# Patient Record
Sex: MALE | Race: WHITE | NOT HISPANIC OR LATINO | Employment: FULL TIME | URBAN - METROPOLITAN AREA
[De-identification: names, ages, dates, MRNs, and addresses within clinical notes are randomized per-mention and may not be internally consistent; named-entity substitution may affect disease eponyms.]

---

## 2018-01-01 ENCOUNTER — OFFICE VISIT (OUTPATIENT)
Dept: URGENT CARE | Facility: PHYSICIAN GROUP | Age: 28
End: 2018-01-01
Payer: COMMERCIAL

## 2018-01-01 VITALS
BODY MASS INDEX: 27.83 KG/M2 | WEIGHT: 210 LBS | HEIGHT: 73 IN | RESPIRATION RATE: 16 BRPM | TEMPERATURE: 98.3 F | OXYGEN SATURATION: 98 % | HEART RATE: 100 BPM | SYSTOLIC BLOOD PRESSURE: 110 MMHG | DIASTOLIC BLOOD PRESSURE: 64 MMHG

## 2018-01-01 DIAGNOSIS — J01.90 ACUTE NON-RECURRENT SINUSITIS, UNSPECIFIED LOCATION: ICD-10-CM

## 2018-01-01 PROCEDURE — 99214 OFFICE O/P EST MOD 30 MIN: CPT | Performed by: PHYSICIAN ASSISTANT

## 2018-01-01 RX ORDER — DEXTROMETHORPHAN HYDROBROMIDE AND PROMETHAZINE HYDROCHLORIDE 15; 6.25 MG/5ML; MG/5ML
SYRUP ORAL
Qty: 180 ML | Refills: 0 | Status: SHIPPED | OUTPATIENT
Start: 2018-01-01 | End: 2018-11-10

## 2018-01-01 RX ORDER — AMOXICILLIN AND CLAVULANATE POTASSIUM 875; 125 MG/1; MG/1
1 TABLET, FILM COATED ORAL 2 TIMES DAILY
Qty: 14 TAB | Refills: 0 | Status: SHIPPED | OUTPATIENT
Start: 2018-01-01 | End: 2018-01-08

## 2018-01-01 ASSESSMENT — ENCOUNTER SYMPTOMS
SWOLLEN GLANDS: 1
SINUS PRESSURE: 1
COUGH: 1
SORE THROAT: 1
HEADACHES: 1
HOARSE VOICE: 1

## 2018-01-01 NOTE — PROGRESS NOTES
"Subjective:      Mehrdad Magallanes is a 27 y.o. male who presents with URI (x1wk cough, chest congestion, sinus pressure/pain)            Sinusitis   This is a new problem. The current episode started 1 to 4 weeks ago. The problem has been gradually worsening since onset. There has been no fever. The fever has been present for less than 1 day. His pain is at a severity of 7/10. The pain is moderate. Associated symptoms include congestion, coughing, ear pain, headaches, a hoarse voice, sinus pressure, a sore throat and swollen glands. Past treatments include acetaminophen, lying down, oral decongestants and spray decongestants. The treatment provided no relief.       Review of Systems   HENT: Positive for congestion, ear pain, hoarse voice, sinus pressure and sore throat.    Respiratory: Positive for cough.    Neurological: Positive for headaches.          Objective:     /64   Pulse 100   Temp 36.8 °C (98.3 °F)   Resp 16   Ht 1.854 m (6' 1\")   Wt 95.3 kg (210 lb)   SpO2 98%   BMI 27.71 kg/m²      Physical Exam       Gen.: Patient is A and O ×3, no acute distress, well-nourished well-hydrated  Vitals: Are listed and unremarkable  HEENT: Heads normocephalic, atraumatic, PERRLA, extraocular movements intact, TMs and oropharynx clear. Patient has pain to percussion of the maxillary sinus region. It is worse on his right side  Neck: Soft supple without cervical lymphadenopathy  Cardiovascular: Regular rate and rhythm normal S1 and S2. No murmurs, rubs or gallops  Lungs are clear to auscultation bilaterally. no wheezes rales or rhonchi  Abdomen is soft, nontender, nondistended with good bowel sounds, no hepatosplenomegaly  Skin: Is well perfused without evidence of rash or lesions  Neurological:  cranial nerves II through XII were assessed and intact.  Musculoskeletal: Full range of motion, 5 out of 5 strength against resistance  Neurovascularly: Intact with a 2 second cap refill, good distal pulses     "   Assessment/Plan:     1. Acute non-recurrent sinusitis, unspecified location  Take Allegra-D as well. Increase fluids and follow-up as needed  - amoxicillin-clavulanate (AUGMENTIN) 875-125 MG Tab; Take 1 Tab by mouth 2 times a day for 7 days.  Dispense: 14 Tab; Refill: 0  - promethazine-dextromethorphan (PROMETHAZINE-DM) 6.25-15 MG/5ML syrup; Take 5mls every six hours as needed for cough  Dispense: 180 mL; Refill: 0

## 2018-01-01 NOTE — LETTER
January 1, 2018         Patient: Mehrdad Magallanes   YOB: 1990   Date of Visit: 1/1/2018           To Whom it May Concern:    Mehrdad Magallanes was seen in my clinic on 1/1/2018. Please excuse from work on Wednesday, January 3, 2018.    If you have any questions or concerns, please don't hesitate to call.        Sincerely,           Heather Clayton P.A.-C.  Electronically Signed

## 2018-11-10 ENCOUNTER — HOSPITAL ENCOUNTER (OUTPATIENT)
Dept: RADIOLOGY | Facility: MEDICAL CENTER | Age: 28
End: 2018-11-10
Attending: FAMILY MEDICINE
Payer: COMMERCIAL

## 2018-11-10 ENCOUNTER — OFFICE VISIT (OUTPATIENT)
Dept: URGENT CARE | Facility: PHYSICIAN GROUP | Age: 28
End: 2018-11-10
Payer: COMMERCIAL

## 2018-11-10 VITALS
TEMPERATURE: 98.3 F | RESPIRATION RATE: 14 BRPM | WEIGHT: 215 LBS | SYSTOLIC BLOOD PRESSURE: 126 MMHG | BODY MASS INDEX: 28.49 KG/M2 | OXYGEN SATURATION: 98 % | HEIGHT: 73 IN | HEART RATE: 96 BPM | DIASTOLIC BLOOD PRESSURE: 86 MMHG

## 2018-11-10 DIAGNOSIS — M54.50 ACUTE LOW BACK PAIN WITHOUT SCIATICA, UNSPECIFIED BACK PAIN LATERALITY: ICD-10-CM

## 2018-11-10 DIAGNOSIS — S59.902A INJURY OF LEFT ELBOW, INITIAL ENCOUNTER: ICD-10-CM

## 2018-11-10 PROCEDURE — 72100 X-RAY EXAM L-S SPINE 2/3 VWS: CPT

## 2018-11-10 PROCEDURE — 99214 OFFICE O/P EST MOD 30 MIN: CPT | Performed by: FAMILY MEDICINE

## 2018-11-10 PROCEDURE — 73080 X-RAY EXAM OF ELBOW: CPT | Mod: LT

## 2018-11-10 PROCEDURE — 72072 X-RAY EXAM THORAC SPINE 3VWS: CPT

## 2018-11-10 ASSESSMENT — PAIN SCALES - GENERAL: PAINLEVEL: 10=SEVERE PAIN

## 2018-11-10 NOTE — PATIENT INSTRUCTIONS
Sling for comfort  Over the counter medication for pain  Icing frequently  Follow up this week, sooner if any worsening or new symptoms    For more information see the handout below      Contusion  A contusion is a deep bruise. Contusions are the result of a blunt injury to tissues and muscle fibers under the skin. The injury causes bleeding under the skin. The skin overlying the contusion may turn blue, purple, or yellow. Minor injuries will give you a painless contusion, but more severe contusions may stay painful and swollen for a few weeks.  What are the causes?  This condition is usually caused by a blow, trauma, or direct force to an area of the body.  What are the signs or symptoms?  Symptoms of this condition include:  · Swelling of the injured area.  · Pain and tenderness in the injured area.  · Discoloration. The area may have redness and then turn blue, purple, or yellow.  How is this diagnosed?  This condition is diagnosed based on a physical exam and medical history. An X-ray, CT scan, or MRI may be needed to determine if there are any associated injuries, such as broken bones (fractures).  How is this treated?  Specific treatment for this condition depends on what area of the body was injured. In general, the best treatment for a contusion is resting, icing, applying pressure to (compression), and elevating the injured area. This is often called the RICE strategy. Over-the-counter anti-inflammatory medicines may also be recommended for pain control.  Follow these instructions at home:  · Rest the injured area.  · If directed, apply ice to the injured area:  ¨ Put ice in a plastic bag.  ¨ Place a towel between your skin and the bag.  ¨ Leave the ice on for 20 minutes, 2-3 times per day.  · If directed, apply light compression to the injured area using an elastic bandage. Make sure the bandage is not wrapped too tightly. Remove and reapply the bandage as directed by your health care provider.  · If  possible, raise (elevate) the injured area above the level of your heart while you are sitting or lying down.  · Take over-the-counter and prescription medicines only as told by your health care provider.  Contact a health care provider if:  · Your symptoms do not improve after several days of treatment.  · Your symptoms get worse.  · You have difficulty moving the injured area.  Get help right away if:  · You have severe pain.  · You have numbness in a hand or foot.  · Your hand or foot turns pale or cold.  This information is not intended to replace advice given to you by your health care provider. Make sure you discuss any questions you have with your health care provider.  Document Released: 09/27/2006 Document Revised: 04/27/2017 Document Reviewed: 05/04/2016  Elsevier Interactive Patient Education © 2017 [employer]

## 2018-11-10 NOTE — PROGRESS NOTES
"Subjective:      Mehrdad Magallanes is a 28 y.o. male who presents with Arm Injury (Left arm pain post fall down the stairs this am)            This is a new problem.  Patient reports that he was caring both of his children in his arms and coming down the steps and slept and fell 6-7 steps this morning.  He hold on tightly to his children so they do not get injured.  He is complaining of left elbow pain.  He denies any neck pain, headache or loss of consciousness.  Review of system positive for mild back pain.  He denies any paresthesias in the upper or lower extremity.  He denies any radiculopathy.  No prior fractures reported in the elbow or in the spine region.  He drove himself here.  He denies any dizziness or lightheadedness.  He has not taken any over-the-counter medication for pain. No other injuries        Review of Systems   All other systems reviewed and are negative.    PMH:  has no past medical history on file.  MEDS: No current outpatient prescriptions on file.  ALLERGIES: No Known Allergies  SURGHX: No past surgical history on file.  SOCHX:  reports that he has never smoked. His smokeless tobacco use includes Chew. He reports that he drinks alcohol. He reports that he does not use drugs.  FH: Family history was reviewed, no pertinent findings to report       Objective:     /86   Pulse 96   Temp 36.8 °C (98.3 °F)   Resp 14   Ht 1.854 m (6' 1\")   Wt 97.5 kg (215 lb)   SpO2 98%   BMI 28.37 kg/m²      Physical Exam   Constitutional: He is oriented to person, place, and time. He appears well-developed and well-nourished.  Non-toxic appearance. He does not have a sickly appearance. He does not appear ill. No distress.   Eyes: Conjunctivae are normal.   Neck: Normal range of motion. No spinous process tenderness and no muscular tenderness present. Normal range of motion present.   Cardiovascular: Normal rate.    Pulmonary/Chest: Effort normal. No respiratory distress.   Musculoskeletal:   " Left shoulder: Normal.        Left elbow: He exhibits decreased range of motion and swelling. He exhibits no deformity and no laceration. Tenderness found. Olecranon process tenderness noted.        Cervical back: He exhibits normal range of motion, no tenderness, no bony tenderness, no swelling, no edema, no deformity, no laceration, no pain, no spasm and normal pulse.        Thoracic back: He exhibits tenderness (in the outline area). He exhibits normal range of motion, no bony tenderness, no swelling, no edema, no deformity, no laceration and normal pulse.        Lumbar back: He exhibits tenderness (in the outline area). He exhibits normal range of motion, no bony tenderness, no swelling, no edema, no deformity, no laceration and normal pulse.        Back:    Patient did not want to initially move the elbow but after xray was encouraged to active ROM and ROM was slightly decreased    No bruising in the back or in the left UE   Neurological: He is alert and oriented to person, place, and time.   Grossly normal   Skin: Skin is warm. He is not diaphoretic. No pallor.     X-ray of the left elbow, thoracic and lumbar spine: Negative for acute abnormalities per my review       Assessment/Plan:     1. Injury of left elbow, initial encounter  - DX-ELBOW-COMPLETE 3+ LEFT; Future  - Slings    2. Acute low back pain without sciatica, unspecified back pain laterality  - DX-LUMBAR SPINE-2 OR 3 VIEWS; Future      Altogether reassuring x-ray which will radiology confirmed  Reassured  Sling for comfort for the elbow and icing frequently and NSAIDs over-the-counter as needed  We also briefly discussed the nature of contusion  Plan per orders and instructions  Warning signs reviewed  The case was discussed and reviewed with Dr Dangelo during Dr. Dos Santos's training period.

## 2019-08-23 DIAGNOSIS — R06.02 SOB (SHORTNESS OF BREATH): ICD-10-CM

## 2019-08-28 ENCOUNTER — NON-PROVIDER VISIT (OUTPATIENT)
Dept: PULMONOLOGY | Facility: HOSPICE | Age: 29
End: 2019-08-28
Attending: INTERNAL MEDICINE
Payer: COMMERCIAL

## 2019-08-28 ENCOUNTER — OFFICE VISIT (OUTPATIENT)
Dept: PULMONOLOGY | Facility: HOSPICE | Age: 29
End: 2019-08-28
Payer: COMMERCIAL

## 2019-08-28 VITALS
RESPIRATION RATE: 16 BRPM | HEART RATE: 101 BPM | DIASTOLIC BLOOD PRESSURE: 68 MMHG | SYSTOLIC BLOOD PRESSURE: 110 MMHG | BODY MASS INDEX: 29.4 KG/M2 | TEMPERATURE: 98.1 F | WEIGHT: 210 LBS | OXYGEN SATURATION: 94 % | HEIGHT: 71 IN

## 2019-08-28 VITALS — BODY MASS INDEX: 29.4 KG/M2 | WEIGHT: 210 LBS | HEIGHT: 71 IN

## 2019-08-28 DIAGNOSIS — R06.02 SOB (SHORTNESS OF BREATH): ICD-10-CM

## 2019-08-28 DIAGNOSIS — Z57.9 OCCUPATIONAL EXPOSURE IN WORKPLACE: ICD-10-CM

## 2019-08-28 DIAGNOSIS — R55 SYNCOPE, UNSPECIFIED SYNCOPE TYPE: ICD-10-CM

## 2019-08-28 PROCEDURE — 94060 EVALUATION OF WHEEZING: CPT | Performed by: INTERNAL MEDICINE

## 2019-08-28 PROCEDURE — 94729 DIFFUSING CAPACITY: CPT | Performed by: INTERNAL MEDICINE

## 2019-08-28 PROCEDURE — 99204 OFFICE O/P NEW MOD 45 MIN: CPT | Mod: 25 | Performed by: INTERNAL MEDICINE

## 2019-08-28 PROCEDURE — 94726 PLETHYSMOGRAPHY LUNG VOLUMES: CPT | Performed by: INTERNAL MEDICINE

## 2019-08-28 RX ORDER — IBUPROFEN 800 MG/1
TABLET ORAL
Refills: 0 | COMMUNITY
Start: 2019-07-10

## 2019-08-28 SDOH — HEALTH STABILITY - PHYSICAL HEALTH: OCCUPATIONAL EXPOSURE TO UNSPECIFIED RISK FACTOR: Z57.9

## 2019-08-28 ASSESSMENT — PULMONARY FUNCTION TESTS
FEV1/FVC_PREDICTED: 82
FVC: 4.7
FEV1_PERCENT_CHANGE: 6
FEV1/FVC_PERCENT_CHANGE: 3
FEV1_PERCENT_PREDICTED: 85
FEV1: 4.09
FEV1_PERCENT_PREDICTED: 90
FVC_PERCENT_PREDICTED: 87
FEV1_PERCENT_CHANGE: 2
FVC_LLN: 4.63
FVC_PERCENT_PREDICTED: 84
FEV1/FVC: 82
FEV1/FVC_PERCENT_PREDICTED: 103
FEV1/FVC: 84.85
FVC: 4.82
FEV1/FVC: 82
FEV1_LLN: 3.76
FEV1/FVC_PERCENT_CHANGE: 300
FEV1/FVC_PERCENT_PREDICTED: 81
FEV1/FVC_PERCENT_PREDICTED: 99
FEV1/FVC_PERCENT_PREDICTED: 101
FEV1_PREDICTED: 4.5
FEV1/FVC_PERCENT_PREDICTED: 103
FVC_PREDICTED: 5.54
FEV1: 3.84
FEV1/FVC: 85
FEV1/FVC_PERCENT_LLN: 68

## 2019-08-28 ASSESSMENT — ENCOUNTER SYMPTOMS
SHORTNESS OF BREATH: 0
PALPITATIONS: 0
SINUS PAIN: 0
ABDOMINAL PAIN: 0
NAUSEA: 0
DEPRESSION: 0
SPUTUM PRODUCTION: 0
SPEECH CHANGE: 0
BLURRED VISION: 0
BACK PAIN: 0
SORE THROAT: 0
EYE DISCHARGE: 0
HEARTBURN: 0
VOMITING: 0
FEVER: 0
FALLS: 0
EYE PAIN: 0
CLAUDICATION: 0
HEMOPTYSIS: 0
DIZZINESS: 0
WEAKNESS: 0
DIAPHORESIS: 0
WHEEZING: 0
EYE REDNESS: 0
STRIDOR: 0
DOUBLE VISION: 0
DIARRHEA: 0
FOCAL WEAKNESS: 0
NECK PAIN: 0
WEIGHT LOSS: 0
CONSTIPATION: 0
CHILLS: 0
MYALGIAS: 0
TREMORS: 0
HEADACHES: 1
ORTHOPNEA: 0
PHOTOPHOBIA: 0
COUGH: 0
PND: 0

## 2019-08-28 NOTE — PROGRESS NOTES
Chief Complaint   Patient presents with   • Establish Care     referral 8/13/19 JAMIL Kim MD DX abnormal results of pulmonary function test    • Results     pft 60 8/28/19        HPI: This patient is a 29 y.o. male presenting for evaluation of syncope following spirometry done for work clearance.  The patient has essentially no past medical history.  He takes no regular prescription medications.  He does take amino acids and see for dietary supplement prior to workouts.  He is a lifelong non-smoker and has a history of chewing tobacco but quit in the past year.  He does have a history of alcohol abuse but has been sober for the past 5 years.  There is no family history of atopic, autoimmune or lung disease.  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  He does require spirometry as part of annual fitness evaluation to perform his job.  He has never had issues with abnormal spirometry or issues performing the testing until the past 2 years after which apparently there was a change in the machine used for clinic spirometry.  The patient tells me that since the change of machine he has difficulty performing the maneuver and with time required for exhalation on the new machine leading to suboccipital headache and subsequent syncope.  The episodes last for only a few seconds and he recovers fully with no evidence of seizure or postictal state.  These episodes have never happened outside of spirometry testing on new machine for work.  He was able to perform full PFTs including spirometry, lung volumes and DLCO maneuver with no syncopal event or headache in our clinic today.  His pulmonary function test show normal air flows, normal lung volumes, normal DLCO.  He does have bronchodilator responsiveness but baseline spirometry is normal as stated.  Flow volume loop is normal.  Patient otherwise denies shortness of breath, cough, dyspnea on exertion.  .    History  reviewed. No pertinent past medical history.    Social History     Socioeconomic History   • Marital status: Single     Spouse name: Not on file   • Number of children: Not on file   • Years of education: Not on file   • Highest education level: Not on file   Occupational History   • Not on file   Social Needs   • Financial resource strain: Not on file   • Food insecurity:     Worry: Not on file     Inability: Not on file   • Transportation needs:     Medical: Not on file     Non-medical: Not on file   Tobacco Use   • Smoking status: Never Smoker   • Smokeless tobacco: Former User     Types: Chew   • Tobacco comment: tobacco chewer 1/2 can daily for 16 years    Substance and Sexual Activity   • Alcohol use: Not Currently     Comment: occaisional    • Drug use: No   • Sexual activity: Not on file   Lifestyle   • Physical activity:     Days per week: Not on file     Minutes per session: Not on file   • Stress: Not on file   Relationships   • Social connections:     Talks on phone: Not on file     Gets together: Not on file     Attends Anabaptism service: Not on file     Active member of club or organization: Not on file     Attends meetings of clubs or organizations: Not on file     Relationship status: Not on file   • Intimate partner violence:     Fear of current or ex partner: Not on file     Emotionally abused: Not on file     Physically abused: Not on file     Forced sexual activity: Not on file   Other Topics Concern   • Not on file   Social History Narrative   • Not on file       Family History   Problem Relation Age of Onset   • No Known Problems Mother    • No Known Problems Father        Current Outpatient Medications on File Prior to Visit   Medication Sig Dispense Refill   • ibuprofen (MOTRIN) 800 MG Tab TK 1 T PO Q 6 H PRN P  0     No current facility-administered medications on file prior to visit.        Allergies: Patient has no known allergies.    ROS:   Review of Systems   Constitutional: Negative for  "chills, diaphoresis, fever, malaise/fatigue and weight loss.   HENT: Negative for congestion, ear discharge, ear pain, hearing loss, nosebleeds, sinus pain, sore throat and tinnitus.    Eyes: Negative for blurred vision, double vision, photophobia, pain, discharge and redness.   Respiratory: Negative for cough, hemoptysis, sputum production, shortness of breath, wheezing and stridor.    Cardiovascular: Negative for chest pain, palpitations, orthopnea, claudication, leg swelling and PND.   Gastrointestinal: Negative for abdominal pain, constipation, diarrhea, heartburn, nausea and vomiting.   Genitourinary: Negative for dysuria and urgency.   Musculoskeletal: Negative for back pain, falls, joint pain, myalgias and neck pain.   Skin: Negative for itching and rash.   Neurological: Positive for headaches. Negative for dizziness, tremors, speech change, focal weakness and weakness.        Syncope   Endo/Heme/Allergies: Negative for environmental allergies.   Psychiatric/Behavioral: Negative for depression.       /68 (BP Location: Left arm, Patient Position: Sitting, BP Cuff Size: Adult)   Pulse (!) 101   Temp 36.7 °C (98.1 °F) (Temporal)   Resp 16   Ht 1.791 m (5' 10.5\")   Wt 95.3 kg (210 lb)   SpO2 94%     Physical Exam:  Physical Exam   Constitutional: He is oriented to person, place, and time. He appears well-developed and well-nourished. No distress.   HENT:   Head: Normocephalic and atraumatic.   Right Ear: External ear normal.   Left Ear: External ear normal.   Mouth/Throat: Oropharynx is clear and moist. No oropharyngeal exudate.   Eyes: Pupils are equal, round, and reactive to light. Conjunctivae and EOM are normal. No scleral icterus.   Neck: Normal range of motion. Neck supple. No tracheal deviation present.   Cardiovascular: Normal rate, regular rhythm and normal heart sounds. Exam reveals no gallop and no friction rub.   No murmur heard.  Pulmonary/Chest: Effort normal and breath sounds normal. No " stridor. No respiratory distress. He has no wheezes.   Abdominal: Soft. He exhibits no distension.   Musculoskeletal: Normal range of motion. He exhibits no edema or deformity.   Neurological: He is alert and oriented to person, place, and time. No cranial nerve deficit.   Skin: Skin is warm and dry. No rash noted.   Psychiatric: He has a normal mood and affect.       PFTs as reviewed by me personally: as per HPI    Assessment:  1. Occupational exposure in workplace  Spirometry   2. Syncope, unspecified syncope type         Plan:  1.  Patient wears required mask protection for all work and has never had issues with pulmonary function including abnormal spirometry or symptoms in the past.  I recommend he continue annual surveillance which can be done with us or through work however I would recommend having the new machine calibrated and evaluated to ensure it is functioning properly given patient reports there have been other colleagues having issues with lightheadedness following testing on most recent machine.  2.  Symptoms including headache and syncope following prolonged breath-hold are most consistent with hyper ventilation which I suspect is induced due to possible excessive exhalation during testing on new machine.  I recommend the machine be evaluated possibly by respiratory therapist.  In the meantime the patient has normal full pulmonary function tests in clinic today.  He has no restrictions from a pulmonary standpoint we are happy to see him on an annual basis.  Return in about 1 year (around 8/28/2020) for pfts.

## 2019-08-28 NOTE — PROCEDURES
Technician: GUILLERMO Matthew    Technician Comment:  Good patient effort & cooperation.  The results of this test meet the ATS/ERS standards for acceptability & reproducibility.  Test was performed on the [employer] Body Plethysmograph-Elite DX system.  Predicted values were [facility]-3 for spirometry, [facility] for DLCO, ITS for Lung Volumes.  The DLCO was uncorrected for Hgb.  A bronchodilator of Ventolin HFA -2puffs via spacer administered.  DLCO performed during dilation period.    Interpretation:  1.  Baseline spirometry shows normal air flows.  2.  There is significant bronchodilator response by absolute increase in FEV1.  3.  Lung volumes are within normal limits.  4.  DLCO is normal to slightly elevated at 123% of predicted.  Normal pulmonary function test with evidence of bronchodilator reactivity.  This may be consistent with a diagnosis of reactive airways disease.  Suggest clinical correlation.

## 2020-06-10 ENCOUNTER — OFFICE VISIT (OUTPATIENT)
Dept: PULMONOLOGY | Facility: HOSPICE | Age: 30
End: 2020-06-10
Payer: COMMERCIAL

## 2020-06-10 ENCOUNTER — NON-PROVIDER VISIT (OUTPATIENT)
Dept: PULMONOLOGY | Facility: HOSPICE | Age: 30
End: 2020-06-10
Payer: COMMERCIAL

## 2020-06-10 VITALS
OXYGEN SATURATION: 93 % | TEMPERATURE: 98.6 F | RESPIRATION RATE: 16 BRPM | DIASTOLIC BLOOD PRESSURE: 66 MMHG | SYSTOLIC BLOOD PRESSURE: 110 MMHG | HEART RATE: 106 BPM | HEIGHT: 71 IN | WEIGHT: 222 LBS | BODY MASS INDEX: 31.08 KG/M2

## 2020-06-10 VITALS — WEIGHT: 222 LBS | HEIGHT: 71 IN | BODY MASS INDEX: 31.08 KG/M2

## 2020-06-10 DIAGNOSIS — Z57.9 OCCUPATIONAL EXPOSURE IN WORKPLACE: ICD-10-CM

## 2020-06-10 DIAGNOSIS — R55 SYNCOPE, UNSPECIFIED SYNCOPE TYPE: ICD-10-CM

## 2020-06-10 DIAGNOSIS — E66.9 CLASS 1 OBESITY WITH BODY MASS INDEX (BMI) OF 31.0 TO 31.9 IN ADULT, UNSPECIFIED OBESITY TYPE, UNSPECIFIED WHETHER SERIOUS COMORBIDITY PRESENT: ICD-10-CM

## 2020-06-10 PROCEDURE — 94060 EVALUATION OF WHEEZING: CPT | Performed by: INTERNAL MEDICINE

## 2020-06-10 PROCEDURE — 99213 OFFICE O/P EST LOW 20 MIN: CPT | Mod: 25 | Performed by: INTERNAL MEDICINE

## 2020-06-10 SDOH — HEALTH STABILITY - PHYSICAL HEALTH: OCCUPATIONAL EXPOSURE TO UNSPECIFIED RISK FACTOR: Z57.9

## 2020-06-10 ASSESSMENT — PULMONARY FUNCTION TESTS
FEV1/FVC: 86.13
FVC: 4.47
FVC_PREDICTED: 5.55
FEV1/FVC_PERCENT_CHANGE: 0
FEV1/FVC_PERCENT_PREDICTED: 104
FVC: 4.52
FEV1_PERCENT_CHANGE: -1
FVC_PERCENT_PREDICTED: 81
FEV1/FVC_PREDICTED: 81.26
FEV1_PREDICTED: 84
FEV1/FVC: 85
FVC_PERCENT_PREDICTED: 80
FEV1: 3.85
FEV1_PERCENT_CHANGE: 0
FEV1_PREDICTED: 4.51
FEV1/FVC_PERCENT_PREDICTED: 106
FEV1_PERCENT_PREDICTED: 85
FEV1: 3.82

## 2020-06-10 ASSESSMENT — ENCOUNTER SYMPTOMS
PALPITATIONS: 0
ORTHOPNEA: 0
EYE PAIN: 0
VOMITING: 0
HEARTBURN: 0
HEMOPTYSIS: 0
DIZZINESS: 0
ABDOMINAL PAIN: 0
EYE REDNESS: 0
MYALGIAS: 0
SINUS PAIN: 0
SORE THROAT: 0
DEPRESSION: 0
COUGH: 0
FEVER: 0
BLURRED VISION: 0
DOUBLE VISION: 0
CONSTIPATION: 0
BACK PAIN: 0
SPUTUM PRODUCTION: 0
STRIDOR: 0
FALLS: 0
PHOTOPHOBIA: 0
NAUSEA: 0
PND: 0
WEAKNESS: 0
CHILLS: 0
EYE DISCHARGE: 0
WHEEZING: 0
NECK PAIN: 0
TREMORS: 0
CLAUDICATION: 0
FOCAL WEAKNESS: 0
HEADACHES: 0
DIARRHEA: 0
WEIGHT LOSS: 0
SPEECH CHANGE: 0
SHORTNESS OF BREATH: 0
DIAPHORESIS: 0

## 2020-06-10 NOTE — PROGRESS NOTES
Chief Complaint   Patient presents with   • Follow-Up     last seen 8/28/19    • Results     PFT Giovany 6/10/2020         HPI: This patient is a 29 y.o. male whom is followed in our clinic for syncope following spirometry testing for work last seen by me on 8/28/19.   The patient has essentially no past medical history.  He takes no regular prescription medications.   He is a lifelong non-smoker and has a history of chewing tobacco but quit roughly one year ago.  He does have a history of alcohol abuse but has been sober for the past 5 years.  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  He does require spirometry as part of annual fitness evaluation to perform his job.  He has never had issues with abnormal spirometry or issues performing the testing until the past 2 years after which  there was a change in the machine used for spirometry.  The patient tells me that since the change of machine he has difficulty performing the maneuver with time required for exhalation leading to suboccipital headache and subsequent syncope.  The episodes last for only a few seconds and he recovers fully with no evidence of seizure or postictal state.  Episodes have never happened outside of spirometry testing on new machine for work.  He was able to perform full PFTs including spirometry, lung volumes and DLCO maneuver with no syncopal event or headache in our clinic last visit which showed normal airflows, +BD response, normal lung volumes and normal DLCO.  Given the patient was asymptomatic outside of his testing episodes, we will plan to see him back for repeat spirometry when due for work fitness physical.  He presents today for spirometry essentially unchanged from pulmonary function testing obtained in August of last year.  Values are within normal limits but on the low side of normal for FVC.  This can be explained by mild obesity.  Otherwise normal flow volume loop.   Patient did have some lightheadedness following the maneuver during his testing today.  Otherwise no new symptoms, he exercises on a regular basis with no shortness of breath or chest pain.  No wheezing.  No cough.    History reviewed. No pertinent past medical history.    Social History     Socioeconomic History   • Marital status: Single     Spouse name: Not on file   • Number of children: Not on file   • Years of education: Not on file   • Highest education level: Not on file   Occupational History   • Not on file   Social Needs   • Financial resource strain: Not on file   • Food insecurity     Worry: Not on file     Inability: Not on file   • Transportation needs     Medical: Not on file     Non-medical: Not on file   Tobacco Use   • Smoking status: Never Smoker   • Smokeless tobacco: Former User     Types: Chew   • Tobacco comment: tobacco chewer 1/2 can daily for 16 years    Substance and Sexual Activity   • Alcohol use: Not Currently     Comment: occaisional    • Drug use: No   • Sexual activity: Not on file   Lifestyle   • Physical activity     Days per week: Not on file     Minutes per session: Not on file   • Stress: Not on file   Relationships   • Social connections     Talks on phone: Not on file     Gets together: Not on file     Attends Yazdanism service: Not on file     Active member of club or organization: Not on file     Attends meetings of clubs or organizations: Not on file     Relationship status: Not on file   • Intimate partner violence     Fear of current or ex partner: Not on file     Emotionally abused: Not on file     Physically abused: Not on file     Forced sexual activity: Not on file   Other Topics Concern   • Not on file   Social History Narrative   • Not on file       Family History   Problem Relation Age of Onset   • No Known Problems Mother    • No Known Problems Father        Current Outpatient Medications on File Prior to Visit   Medication Sig Dispense Refill   • ibuprofen  "(MOTRIN) 800 MG Tab TK 1 T PO Q 6 H PRN P  0     No current facility-administered medications on file prior to visit.        Patient has no known allergies.      ROS:   Review of Systems   Constitutional: Negative for chills, diaphoresis, fever, malaise/fatigue and weight loss.   HENT: Negative for congestion, ear discharge, ear pain, hearing loss, nosebleeds, sinus pain, sore throat and tinnitus.    Eyes: Negative for blurred vision, double vision, photophobia, pain, discharge and redness.   Respiratory: Negative for cough, hemoptysis, sputum production, shortness of breath, wheezing and stridor.    Cardiovascular: Negative for chest pain, palpitations, orthopnea, claudication, leg swelling and PND.   Gastrointestinal: Negative for abdominal pain, constipation, diarrhea, heartburn, nausea and vomiting.   Genitourinary: Negative for dysuria and urgency.   Musculoskeletal: Negative for back pain, falls, joint pain, myalgias and neck pain.   Skin: Negative for itching and rash.   Neurological: Negative for dizziness, tremors, speech change, focal weakness, weakness and headaches.   Endo/Heme/Allergies: Negative for environmental allergies.   Psychiatric/Behavioral: Negative for depression.       /66 (BP Location: Left arm, Patient Position: Sitting, BP Cuff Size: Large adult)   Pulse (!) 106   Temp 37 °C (98.6 °F) (Temporal)   Resp 16   Ht 1.791 m (5' 10.5\")   Wt 100.7 kg (222 lb)   SpO2 93%   Physical Exam  Vitals signs reviewed.   Constitutional:       General: He is not in acute distress.     Appearance: Normal appearance. He is obese.   HENT:      Head: Normocephalic and atraumatic.      Right Ear: External ear normal.      Left Ear: External ear normal.      Nose: Nose normal. No congestion.      Mouth/Throat:      Mouth: Mucous membranes are moist.      Pharynx: Oropharynx is clear. No oropharyngeal exudate.   Eyes:      General: No scleral icterus.     Extraocular Movements: Extraocular movements " intact.      Conjunctiva/sclera: Conjunctivae normal.      Pupils: Pupils are equal, round, and reactive to light.   Neck:      Musculoskeletal: Normal range of motion and neck supple.   Cardiovascular:      Rate and Rhythm: Normal rate and regular rhythm.      Heart sounds: Normal heart sounds. No murmur. No gallop.    Pulmonary:      Effort: Pulmonary effort is normal. No respiratory distress.      Breath sounds: Normal breath sounds. No wheezing or rales.   Abdominal:      General: There is no distension.      Palpations: Abdomen is soft.   Musculoskeletal: Normal range of motion.      Right lower leg: No edema.      Left lower leg: No edema.   Skin:     General: Skin is warm and dry.      Findings: No rash.   Neurological:      Mental Status: He is alert and oriented to person, place, and time.      Cranial Nerves: No cranial nerve deficit.   Psychiatric:         Mood and Affect: Mood normal.         Behavior: Behavior normal.         PFTs as reviewed by me personally:  As per hPI    Assessment:  1. Syncope, unspecified syncope type  Spirometry   2. Occupational exposure in workplace  Spirometry   3. Class 1 obesity with body mass index (BMI) of 31.0 to 31.9 in adult, unspecified obesity type, unspecified whether serious comorbidity present         Plan:  1.  Suspect secondary to hyperventilation following excessive exhalation.  Patient did get lightheaded during his testing today.  Given propensity towards this we will continue to see him on an annual basis for spirometry testing in our clinic.  At this time he has no significant changes from spirometry done in August and is okay to proceed with current occupation.  2.  Patient does wear protection for any occupational exposures.  Spirometry overall stable and remains within normal limits.  We will see him on an annual basis for spirometry related to his work fitness physical.  3.  This is a patient at risk for obesity related pulmonary complications.   Encouraged healthy lifestyle habits.  He is active on a daily basis.  Return in about 1 year (around 6/10/2021) for spirometry.

## 2020-06-10 NOTE — PROCEDURES
Tech: Hanna Coughlin, RRT  Tech notes: Good patient effort & cooperation.  Vagal nerve response towards end of exhalation during pre FVC.  Pt recovered within a couple of seconds.  Pt c/o migraine in back of head after pre and post FVC maneuver.  The results of this test meet the ATS/ERS standards for acceptability & reproducibility.  Test was performed on the eSolar Body Plethysmograph-Elite DX system.  Predicted values were GLI-2012 for spirometry, GLI- 2017 for DLCO, ITS for Lung Volumes.  The DLCO was uncorrected for Hgb.  A bronchodilator of Ventolin HFA -2puffs via spacer administered.  DLCO performed during dilation period.    Interpretation:  1.  Baseline spirometry shows normal airflows.  FVC is on the lower end of normal at 4.52 L or 82% predicted.  FEV1/FVC ratio is normal at 85.  2.  There is no significant bronchodilator response.  Normal baseline spirometry with normal flow volume loop.

## 2022-02-18 ENCOUNTER — OFFICE VISIT (OUTPATIENT)
Dept: URGENT CARE | Facility: PHYSICIAN GROUP | Age: 32
End: 2022-02-18
Payer: COMMERCIAL

## 2022-02-18 VITALS
BODY MASS INDEX: 31.5 KG/M2 | HEART RATE: 108 BPM | RESPIRATION RATE: 16 BRPM | TEMPERATURE: 100.8 F | WEIGHT: 220 LBS | OXYGEN SATURATION: 95 % | SYSTOLIC BLOOD PRESSURE: 124 MMHG | HEIGHT: 70 IN | DIASTOLIC BLOOD PRESSURE: 86 MMHG

## 2022-02-18 DIAGNOSIS — U07.1 COVID-19 VIRUS INFECTION: Primary | ICD-10-CM

## 2022-02-18 DIAGNOSIS — Z20.822 SUSPECTED COVID-19 VIRUS INFECTION: ICD-10-CM

## 2022-02-18 DIAGNOSIS — R50.9 FEVER IN ADULT: ICD-10-CM

## 2022-02-18 DIAGNOSIS — R05.9 COUGH IN ADULT: ICD-10-CM

## 2022-02-18 LAB
EXTERNAL QUALITY CONTROL: NORMAL
SARS-COV+SARS-COV-2 AG RESP QL IA.RAPID: POSITIVE

## 2022-02-18 PROCEDURE — 87426 SARSCOV CORONAVIRUS AG IA: CPT | Performed by: NURSE PRACTITIONER

## 2022-02-18 PROCEDURE — 99213 OFFICE O/P EST LOW 20 MIN: CPT | Mod: CS | Performed by: NURSE PRACTITIONER

## 2022-02-18 RX ORDER — DEXTROMETHORPHAN HYDROBROMIDE AND PROMETHAZINE HYDROCHLORIDE 15; 6.25 MG/5ML; MG/5ML
5 SYRUP ORAL EVERY 4 HOURS PRN
Qty: 100 ML | Refills: 0 | Status: SHIPPED | OUTPATIENT
Start: 2022-02-18 | End: 2022-02-25

## 2022-02-18 RX ORDER — IBUPROFEN 200 MG
600 TABLET ORAL ONCE
Status: COMPLETED | OUTPATIENT
Start: 2022-02-18 | End: 2022-02-18

## 2022-02-18 RX ADMIN — Medication 600 MG: at 11:30

## 2022-02-18 ASSESSMENT — ENCOUNTER SYMPTOMS
WHEEZING: 0
DIARRHEA: 0
NAUSEA: 0
MYALGIAS: 0
VOMITING: 0
SINUS PRESSURE: 1
FEVER: 1
SORE THROAT: 1
HEADACHES: 1
COUGH: 1
ABDOMINAL PAIN: 0
SHORTNESS OF BREATH: 0
HOARSE VOICE: 1
HEMOPTYSIS: 0
SINUS PAIN: 1
CHILLS: 0
SPUTUM PRODUCTION: 1

## 2022-02-18 NOTE — PROGRESS NOTES
Subjective:     Mehrdad Magallanes is a 31 y.o. male who presents for Sinusitis (Sinus pressure, pressure in ears, drainage, fever x 1wk )      Sinusitis  This is a new problem. The current episode started in the past 7 days (Mehrdad is a pleasant 31 year old male who presents to  today with compliants of sinus pain, PND, headache, fever and ear pain. ). The problem has been gradually worsening since onset. The maximum temperature recorded prior to his arrival was 101 - 101.9 F. His pain is at a severity of 5/10. Associated symptoms include congestion, coughing, ear pain, headaches, a hoarse voice, sinus pressure and a sore throat. Pertinent negatives include no chills or shortness of breath. Past treatments include acetaminophen. The treatment provided mild relief.         Review of Systems   Constitutional: Positive for fever and malaise/fatigue. Negative for chills.   HENT: Positive for congestion, ear pain, hoarse voice, sinus pressure, sinus pain and sore throat.    Respiratory: Positive for cough and sputum production. Negative for hemoptysis, shortness of breath and wheezing.    Gastrointestinal: Negative for abdominal pain, diarrhea, nausea and vomiting.   Musculoskeletal: Negative for myalgias.   Neurological: Positive for headaches.       PMH: History reviewed. No pertinent past medical history.  ALLERGIES: No Known Allergies  SURGHX: History reviewed. No pertinent surgical history.  SOCHX:   Social History     Socioeconomic History   • Marital status: Single   Tobacco Use   • Smoking status: Never Smoker   • Smokeless tobacco: Former User     Types: Chew   • Tobacco comment: tobacco chewer 1/2 can daily for 16 years    Vaping Use   • Vaping Use: Never used   Substance and Sexual Activity   • Alcohol use: Not Currently     Comment: occaisional    • Drug use: No     FH:   Family History   Problem Relation Age of Onset   • No Known Problems Mother    • No Known Problems Father          Objective:   BP  "124/86 (BP Location: Left arm, Patient Position: Sitting, BP Cuff Size: Adult)   Pulse (!) 108   Temp (!) 38.2 °C (100.8 °F) (Temporal)   Resp 16   Ht 1.778 m (5' 10\")   Wt 99.8 kg (220 lb)   SpO2 95%   BMI 31.57 kg/m²     Physical Exam  Vitals and nursing note reviewed.   Constitutional:       General: He is not in acute distress.     Appearance: Normal appearance. He is not ill-appearing.   HENT:      Head: Normocephalic and atraumatic.      Right Ear: External ear normal.      Left Ear: External ear normal.      Nose: No congestion or rhinorrhea.      Mouth/Throat:      Mouth: Mucous membranes are moist.   Eyes:      Extraocular Movements: Extraocular movements intact.      Pupils: Pupils are equal, round, and reactive to light.   Cardiovascular:      Rate and Rhythm: Normal rate and regular rhythm.      Pulses: Normal pulses.      Heart sounds: Normal heart sounds.   Pulmonary:      Effort: Pulmonary effort is normal.      Breath sounds: Normal breath sounds.   Abdominal:      General: Abdomen is flat. Bowel sounds are normal.      Palpations: Abdomen is soft.      Tenderness: There is no abdominal tenderness. There is no right CVA tenderness or left CVA tenderness.   Musculoskeletal:         General: Normal range of motion.      Cervical back: Normal range of motion and neck supple.   Skin:     General: Skin is warm and dry.      Capillary Refill: Capillary refill takes less than 2 seconds.   Neurological:      General: No focal deficit present.      Mental Status: He is alert and oriented to person, place, and time. Mental status is at baseline.   Psychiatric:         Mood and Affect: Mood normal.         Behavior: Behavior normal.         Thought Content: Thought content normal.         Judgment: Judgment normal.         Assessment/Plan:   Assessment      AVS handout given and reviewed with patient. Pt educated on red flags and when to seek treatment back in ER or UC.     There are no diagnoses linked " to this encounter.

## 2022-02-18 NOTE — LETTER
February 18, 2022    To Whom It May Concern:         This is confirmation that Mehrdad Magallanes attended his scheduled appointment with LINH Hernandez on 2/18/22.   Your employee was seen in our clinic today and tested positive for COVID 19.     The [facility] department should be contacting you for further instructions on duration of self-isolation and return to work protocol.  In general, this will also follow the CDC guidelines with a minimum of 5 days from the onset of symptoms and without fever, vomiting, or diarrhea for an additional 24 hour period following the 10 day  isolation.      In general, repeat testing is NOT necessary and not offered through the [facility] care.     This is the only note that will be provided from the [facility] for this visit.  Your employee will require an appointment with a primary care provider if FMLA or disability forms are required.         If you have any questions please do not hesitate to call me at the phone number listed below.    Sincerely,          MICHELLE Hernandez.  [phone]

## 2025-04-14 ENCOUNTER — HOSPITAL ENCOUNTER (OUTPATIENT)
Facility: MEDICAL CENTER | Age: 35
End: 2025-04-14
Attending: NURSE PRACTITIONER
Payer: COMMERCIAL

## 2025-04-14 ENCOUNTER — EH NON-PROVIDER (OUTPATIENT)
Dept: OCCUPATIONAL MEDICINE | Facility: CLINIC | Age: 35
End: 2025-04-14

## 2025-04-14 ENCOUNTER — EMPLOYEE HEALTH (OUTPATIENT)
Dept: OCCUPATIONAL MEDICINE | Facility: CLINIC | Age: 35
End: 2025-04-14

## 2025-04-14 DIAGNOSIS — Z02.1 PRE-EMPLOYMENT HEALTH SCREENING EXAMINATION: ICD-10-CM

## 2025-04-14 DIAGNOSIS — Z02.1 ENCOUNTER FOR PRE-EMPLOYMENT HEALTH SCREENING EXAMINATION: ICD-10-CM

## 2025-04-14 DIAGNOSIS — Z02.1 PRE-EMPLOYMENT DRUG SCREENING: ICD-10-CM

## 2025-04-14 LAB
AMP AMPHETAMINE: NORMAL
BAR BARBITURATES: NORMAL
BZO BENZODIAZEPINES: NORMAL
COC COCAINE: NORMAL
INT CON NEG: NORMAL
INT CON POS: NORMAL
MDMA ECSTASY: NORMAL
MET METHAMPHETAMINES: NORMAL
MTD METHADONE: NORMAL
OPI OPIATES: NORMAL
OXY OXYCODONE: NORMAL
PCP PHENCYCLIDINE: NORMAL
POC URINE DRUG SCREEN OCDRS: NEGATIVE
THC: NORMAL

## 2025-04-14 PROCEDURE — 94375 RESPIRATORY FLOW VOLUME LOOP: CPT | Performed by: NURSE PRACTITIONER

## 2025-04-14 PROCEDURE — 8915 PR COMPREHENSIVE PHYSICAL: Performed by: NURSE PRACTITIONER

## 2025-04-14 PROCEDURE — 80305 DRUG TEST PRSMV DIR OPT OBS: CPT | Performed by: NURSE PRACTITIONER

## 2025-04-14 PROCEDURE — 86480 TB TEST CELL IMMUN MEASURE: CPT | Performed by: NURSE PRACTITIONER

## 2025-04-14 NOTE — PROGRESS NOTES
This patient was seen for a pre-employment MA visit.     Company- [employer]  Position - Acute care tech    Pre-employment drug screen completed - complete  Color blindness test - NA  Quantiferon Gold TB test -collected  Provider Physical - complete  Mask Fit- N95/CAPR/PAPR -complete  Spirometry date -NA     Hand Hygiene form - complete    Vaccines/Titers     Tdap - docs  COVID -19-declination  MMR - docs   Varicella - docs  Hepatitis B - docs  Hepatitis A - NA   Flu Shot - NA   Sticker provided - NA  Specialty or other testing needed at this time -NA    The patient has been informed about and verbalizes understanding of the applicable pre-employment requirements set by the employer. yes  The patient has been informed about and verbalizes understanding that they may be required to return to Occupational Health for additional vaccinations or testing. yes

## 2025-04-17 LAB
GAMMA INTERFERON BACKGROUND BLD IA-ACNC: 0.02 IU/ML
M TB IFN-G BLD-IMP: NEGATIVE
M TB IFN-G CD4+ BCKGRND COR BLD-ACNC: 0 IU/ML
MITOGEN IGNF BCKGRD COR BLD-ACNC: >10 IU/ML
QFT TB2 - NIL TBQ2: 0 IU/ML